# Patient Record
Sex: FEMALE | Race: BLACK OR AFRICAN AMERICAN | Employment: FULL TIME | URBAN - METROPOLITAN AREA
[De-identification: names, ages, dates, MRNs, and addresses within clinical notes are randomized per-mention and may not be internally consistent; named-entity substitution may affect disease eponyms.]

---

## 2017-03-03 ENCOUNTER — OFFICE VISIT (OUTPATIENT)
Dept: RHEUMATOLOGY | Facility: CLINIC | Age: 42
End: 2017-03-03

## 2017-03-03 VITALS
HEIGHT: 64 IN | HEART RATE: 76 BPM | DIASTOLIC BLOOD PRESSURE: 69 MMHG | SYSTOLIC BLOOD PRESSURE: 109 MMHG | WEIGHT: 202.31 LBS | BODY MASS INDEX: 34.54 KG/M2

## 2017-03-03 DIAGNOSIS — M25.50 POLYARTHRALGIA: Primary | ICD-10-CM

## 2017-03-03 DIAGNOSIS — M79.10 MYALGIA: ICD-10-CM

## 2017-03-03 PROCEDURE — 99244 OFF/OP CNSLTJ NEW/EST MOD 40: CPT | Performed by: INTERNAL MEDICINE

## 2017-03-03 PROCEDURE — 99212 OFFICE O/P EST SF 10 MIN: CPT | Performed by: INTERNAL MEDICINE

## 2017-03-03 RX ORDER — METHIMAZOLE 10 MG/1
TABLET ORAL DAILY
Refills: 1 | COMMUNITY
Start: 2017-01-27

## 2017-03-03 RX ORDER — GABAPENTIN 400 MG/1
400 CAPSULE ORAL
Refills: 3 | COMMUNITY
Start: 2017-02-13

## 2017-03-03 RX ORDER — METOCLOPRAMIDE 10 MG/1
10 TABLET ORAL DAILY
Refills: 2 | COMMUNITY
Start: 2016-12-29 | End: 2017-03-03

## 2017-03-03 RX ORDER — SUMATRIPTAN 100 MG/1
TABLET, FILM COATED ORAL AS NEEDED
Refills: 0 | COMMUNITY
Start: 2017-02-09

## 2017-03-03 RX ORDER — PREDNISONE 10 MG/1
TABLET ORAL
Qty: 21 TABLET | Refills: 0 | Status: SHIPPED | OUTPATIENT
Start: 2017-03-03

## 2017-03-03 NOTE — PROGRESS NOTES
Edgar Umana is a 37year old female who presents for Patient presents with:  Joint Pain  Muscle Pain  . HPI:     I had the pleasure of seeing Edgar Umana on 3/3/2017 for evaluation.      She sarted having joitn pain - it started in her right wirst Comment laproscopy for  endometriosis    OTHER SURGICAL HISTORY  1997    Comment cyst removed from ovary/laproscopy    OTHER SURGICAL HISTORY  1999    Comment fibroids removed    OTHER SURGICAL HISTORY  2001    Comment tubes unblocked    OTHER SURGICAL HIS no oral ulcers, EOM intact, clear sclear, PERRLA, pleasant, no acute distress, no CAD, no neck tendnerness, good ROM,   No rashes  CVS: RRR, no murmurs  RS: CTAB, no crackles, no rhonchi  ABD: Soft Non tender, no HSM felt, BS positive  Joint exam:   Tender PM

## 2017-03-03 NOTE — PATIENT INSTRUCTIONS
1. Check labs  2. Prednisone 10mg - Take 6 tabsx 1 day, take 5 tabsx 1 day, take 4 tabsx 1 day,take 3 tabsx 1 day, take 2 tabsx 1 day, take 1 tabsx 1 day, then off  3. Return to [facility] in 1-2 weeks.

## 2017-03-10 ENCOUNTER — TELEPHONE (OUTPATIENT)
Dept: RHEUMATOLOGY | Facility: CLINIC | Age: 42
End: 2017-03-10

## 2017-03-10 NOTE — TELEPHONE ENCOUNTER
Pt requesting 3/3 progress notes  and copy of lab  Orders to her pcp Dr Kalani Tomas ph# [phone]- did not know fax#  Ying Ruffin having problems with her ins & pcp needs to know what labs were ordered

## 2017-04-12 ENCOUNTER — TELEPHONE (OUTPATIENT)
Dept: RHEUMATOLOGY | Facility: CLINIC | Age: 42
End: 2017-04-12

## 2017-04-12 NOTE — TELEPHONE ENCOUNTER
Dr. Penny Burnham received a letter from [facility] [place] [facility] [place] that was sent to her in error. Per Dr. Penny Burnham send the letter to the patient. Letter mailed.

## 2020-02-19 ENCOUNTER — HOSPITAL (OUTPATIENT)
Dept: OTHER | Age: 45
End: 2020-02-19
Attending: ORTHOPAEDIC SURGERY

## 2021-12-24 ENCOUNTER — HOSPITAL ENCOUNTER (OUTPATIENT)
Age: 46
Discharge: STILL ADMITTED OR ADMINISTRATIVE | End: 2021-12-24
Payer: COMMERCIAL

## 2021-12-24 ENCOUNTER — HOSPITAL ENCOUNTER (OUTPATIENT)
Age: 46
Discharge: HOME | End: 2021-12-24
Attending: EMERGENCY MEDICINE
Payer: COMMERCIAL

## 2021-12-24 VITALS
DIASTOLIC BLOOD PRESSURE: 81 MMHG | RESPIRATION RATE: 18 BRPM | OXYGEN SATURATION: 100 % | TEMPERATURE: 99 F | HEART RATE: 118 BPM | SYSTOLIC BLOOD PRESSURE: 130 MMHG

## 2021-12-24 DIAGNOSIS — Z20.822 ENCOUNTER FOR LABORATORY TESTING FOR COVID-19 VIRUS: Primary | ICD-10-CM

## 2021-12-24 DIAGNOSIS — U07.1 COVID-19: ICD-10-CM

## 2021-12-24 PROCEDURE — 81002 URINALYSIS NONAUTO W/O SCOPE: CPT | Performed by: EMERGENCY MEDICINE

## 2021-12-24 PROCEDURE — U0002 COVID-19 LAB TEST NON-CDC: HCPCS | Performed by: EMERGENCY MEDICINE

## 2021-12-24 PROCEDURE — 99213 OFFICE O/P EST LOW 20 MIN: CPT | Performed by: EMERGENCY MEDICINE

## 2021-12-24 RX ORDER — CYCLOBENZAPRINE HCL 10 MG
10 TABLET ORAL NIGHTLY
COMMUNITY
Start: 2021-12-07

## 2021-12-24 RX ORDER — TOPIRAMATE 25 MG/1
TABLET ORAL 2 TIMES DAILY
COMMUNITY
Start: 2021-12-01

## 2021-12-24 NOTE — ED INITIAL ASSESSMENT (HPI)
Pt here for covid testing , pt states pt has been having body aches, and cough for 2 days, pt also states she is having lower back pain and wants to make sure she is not getting a UTI, pt denies any fevers

## 2021-12-24 NOTE — ED PROVIDER NOTES
Patient Seen in: [facility]      History   No chief complaint on file. Stated Complaint: eval    Subjective:   HPI    2 days of URI symptoms. No known exposure.      Objective:   Past Medical History:   Diagnosis Date   • Endometriosis and Affect: Mood normal.         Behavior: Behavior normal.              ED Course     Labs Reviewed   RAPID SARS-COV-2 BY PCR                   MDM      COVID positive.                               Disposition and Plan     Clinical Impression:  Encounter

## 2022-10-18 ENCOUNTER — APPOINTMENT (OUTPATIENT)
Dept: GENERAL RADIOLOGY | Facility: HOSPITAL | Age: 47
End: 2022-10-18
Payer: COMMERCIAL

## 2022-10-18 ENCOUNTER — HOSPITAL ENCOUNTER (EMERGENCY)
Facility: HOSPITAL | Age: 47
Discharge: HOME | End: 2022-10-18
Payer: COMMERCIAL

## 2022-10-18 VITALS
TEMPERATURE: 98 F | BODY MASS INDEX: 31 KG/M2 | DIASTOLIC BLOOD PRESSURE: 67 MMHG | SYSTOLIC BLOOD PRESSURE: 113 MMHG | OXYGEN SATURATION: 97 % | RESPIRATION RATE: 18 BRPM | WEIGHT: 180 LBS | HEART RATE: 98 BPM

## 2022-10-18 DIAGNOSIS — J40 BRONCHITIS: Primary | ICD-10-CM

## 2022-10-18 DIAGNOSIS — R04.2 HEMOPTYSIS: ICD-10-CM

## 2022-10-18 PROCEDURE — 99283 EMERGENCY DEPT VISIT LOW MDM: CPT

## 2022-10-18 PROCEDURE — 71045 X-RAY EXAM CHEST 1 VIEW: CPT

## 2022-10-18 NOTE — ED QUICK NOTES
Pt presents to the ED for eval of cough. Pt reports productive cough x 1 week. Hemoptysis x 2 days. +CP associated with dyspnea on exertion. Has been taking Tylenol, Mucinex, and Inhaler Q4hrs with no relief. Went to [address] [place] [address] today and sent here for r/o PE. Ambulatory to the bed with steady gait. RR remain even and non-labored.

## 2022-10-18 NOTE — ED INITIAL ASSESSMENT (HPI)
Cough x 1 week. Denies fevers/chills +congestion  Patient notes seeing blood while coughing yesterday.

## 2023-12-11 ENCOUNTER — HOSPITAL ENCOUNTER (EMERGENCY)
Facility: HOSPITAL | Age: 48
Discharge: HOME | End: 2023-12-11
Attending: EMERGENCY MEDICINE
Payer: COMMERCIAL

## 2023-12-11 VITALS
TEMPERATURE: 99 F | WEIGHT: 185 LBS | HEART RATE: 91 BPM | SYSTOLIC BLOOD PRESSURE: 133 MMHG | HEIGHT: 64 IN | BODY MASS INDEX: 31.58 KG/M2 | OXYGEN SATURATION: 98 % | RESPIRATION RATE: 20 BRPM | DIASTOLIC BLOOD PRESSURE: 85 MMHG

## 2023-12-11 DIAGNOSIS — R20.2 PARESTHESIAS: Primary | ICD-10-CM

## 2023-12-11 LAB
ALBUMIN SERPL-MCNC: 4.3 G/DL (ref 3.2–4.8)
ALBUMIN/GLOB SERPL: 1.4 {RATIO} (ref 1–2)
ALP LIVER SERPL-CCNC: 85 U/L
ALT SERPL-CCNC: 8 U/L
ANION GAP SERPL CALC-SCNC: 5 MMOL/L (ref 0–18)
AST SERPL-CCNC: 14 U/L (ref ?–34)
BASOPHILS # BLD AUTO: 0.1 X10(3) UL (ref 0–0.2)
BASOPHILS NFR BLD AUTO: 1.7 %
BILIRUB SERPL-MCNC: 0.3 MG/DL (ref 0.3–1.2)
BUN BLD-MCNC: 11 MG/DL (ref 9–23)
BUN/CREAT SERPL: 14.1 (ref 10–20)
CALCIUM BLD-MCNC: 9.7 MG/DL (ref 8.7–10.4)
CHLORIDE SERPL-SCNC: 111 MMOL/L (ref 98–112)
CO2 SERPL-SCNC: 24 MMOL/L (ref 21–32)
CREAT BLD-MCNC: 0.78 MG/DL
DEPRECATED RDW RBC AUTO: 38.9 FL (ref 35.1–46.3)
EGFRCR SERPLBLD CKD-EPI 2021: 94 ML/MIN/1.73M2 (ref 60–?)
EOSINOPHIL # BLD AUTO: 0.34 X10(3) UL (ref 0–0.7)
EOSINOPHIL NFR BLD AUTO: 5.7 %
ERYTHROCYTE [DISTWIDTH] IN BLOOD BY AUTOMATED COUNT: 12.7 % (ref 11–15)
GLOBULIN PLAS-MCNC: 3.1 G/DL (ref 2.8–4.4)
GLUCOSE BLD-MCNC: 81 MG/DL (ref 70–99)
HCT VFR BLD AUTO: 37.3 %
HGB BLD-MCNC: 12.2 G/DL
IMM GRANULOCYTES # BLD AUTO: 0.02 X10(3) UL (ref 0–1)
IMM GRANULOCYTES NFR BLD: 0.3 %
LYMPHOCYTES # BLD AUTO: 2.06 X10(3) UL (ref 1–4)
LYMPHOCYTES NFR BLD AUTO: 34.5 %
MCH RBC QN AUTO: 27.4 PG (ref 26–34)
MCHC RBC AUTO-ENTMCNC: 32.7 G/DL (ref 31–37)
MCV RBC AUTO: 83.6 FL
MONOCYTES # BLD AUTO: 0.44 X10(3) UL (ref 0.1–1)
MONOCYTES NFR BLD AUTO: 7.4 %
NEUTROPHILS # BLD AUTO: 3.01 X10 (3) UL (ref 1.5–7.7)
NEUTROPHILS # BLD AUTO: 3.01 X10(3) UL (ref 1.5–7.7)
NEUTROPHILS NFR BLD AUTO: 50.4 %
OSMOLALITY SERPL CALC.SUM OF ELEC: 288 MOSM/KG (ref 275–295)
PLATELET # BLD AUTO: 469 10(3)UL (ref 150–450)
POTASSIUM SERPL-SCNC: 3.7 MMOL/L (ref 3.5–5.1)
PROT SERPL-MCNC: 7.4 G/DL (ref 5.7–8.2)
RBC # BLD AUTO: 4.46 X10(6)UL
SODIUM SERPL-SCNC: 140 MMOL/L (ref 136–145)
WBC # BLD AUTO: 6 X10(3) UL (ref 4–11)

## 2023-12-11 PROCEDURE — 99283 EMERGENCY DEPT VISIT LOW MDM: CPT

## 2023-12-11 PROCEDURE — 80053 COMPREHEN METABOLIC PANEL: CPT | Performed by: EMERGENCY MEDICINE

## 2023-12-11 PROCEDURE — 85025 COMPLETE CBC W/AUTO DIFF WBC: CPT | Performed by: EMERGENCY MEDICINE

## 2023-12-11 PROCEDURE — 85025 COMPLETE CBC W/AUTO DIFF WBC: CPT

## 2023-12-11 PROCEDURE — 80053 COMPREHEN METABOLIC PANEL: CPT

## 2023-12-11 PROCEDURE — 36415 COLL VENOUS BLD VENIPUNCTURE: CPT

## 2023-12-11 NOTE — ED INITIAL ASSESSMENT (HPI)
Patient to ED had surgery Wednesday; on Friday before 0900 she noticed she's having some paralysis on entire right side of face recently had surgery to have ovary removed, she assumed her lip is swollen but when smiling in mirror she noticed right lip droop, it subsides but at times it gets worse, pt has HA to right side of head and is normally where her migraines start denies having aura.

## 2023-12-11 NOTE — ED QUICK NOTES
No stroke alert per Dr. Reggie Buckner due to symptom onset at [phone] on Friday; pt had taken photo at that time when she noticed facial droop.

## 2023-12-12 NOTE — ED QUICK NOTES
Chief Complaint   Patient presents with    Numbness Weakness     Patient aox3 to ed via private vehicle patient recently had surgery x Wednesday, stated was dc with abx that patient became allergic to with throat/tongue swelling. Patient stated medication stopped but reported Friday patient noticed her right nostril to right mouth area has been continuously numb. Patient stated although her smile is equal patient does feel the droop while she is talking. Denies headache/dizziness. No pronator drift noted.

## (undated) NOTE — MR AVS SNAPSHOT
[address] [address]  [phone]               Thank you for choosing us for your health care visit with David Cruz MD.  We are glad to serve you and happy to provide you with this alvarado 109/69 mmHg 76 5' 4\" (1.626 m) 202 lb 4.8 oz (91.763 kg) 34.71 kg/m2         Current Medications          This list is accurate as of: 3/3/17  1:40 PM.  Always use your most recent med list.                gabapentin 400 MG Caps   400 mg 3 (three) times Healthy Diet and Regular Exercise  [employer] [facility] [employer] for making healthy food choices  -   Enjoy your food, but eat less. Fully enjoy your food when eating. Don’t eat while distracted and slow down. Avoid over sized portions.    Schuyler Villegas

## (undated) NOTE — Clinical Note
March 6, 2017         Rosario Hutton, [address] [facility]      Patient: Cesar Villaseñor   YOB: 1975   Date of Visit: 3/3/2017       Dear Dr. Kalyan Lockett,    I saw your patient, Cesar Villaseñor, on 3/3/2017. know if that's why she has pain right now.    The back pain started at this time. ASSESSMENT AND PLAN:    Jeffrey Cortes is a 37year old female who presents for Patient presents with:  Joint Pain  Muscle Pain    1.  Polyarthralgia/myalgias - ph